# Patient Record
Sex: FEMALE | Race: WHITE
[De-identification: names, ages, dates, MRNs, and addresses within clinical notes are randomized per-mention and may not be internally consistent; named-entity substitution may affect disease eponyms.]

---

## 2023-08-18 ENCOUNTER — HOSPITAL ENCOUNTER (EMERGENCY)
Dept: HOSPITAL 33 - ED | Age: 33
LOS: 1 days | Discharge: HOME | End: 2023-08-19
Payer: COMMERCIAL

## 2023-08-18 VITALS — HEART RATE: 107 BPM | RESPIRATION RATE: 16 BRPM | TEMPERATURE: 97.6 F

## 2023-08-18 VITALS — SYSTOLIC BLOOD PRESSURE: 120 MMHG | OXYGEN SATURATION: 97 % | DIASTOLIC BLOOD PRESSURE: 82 MMHG

## 2023-08-18 DIAGNOSIS — N39.0: ICD-10-CM

## 2023-08-18 DIAGNOSIS — R56.9: ICD-10-CM
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
## 2023-08-18 DIAGNOSIS — Z79.899: ICD-10-CM

## 2023-08-18 DIAGNOSIS — S09.90XA: Primary | ICD-10-CM

## 2023-08-18 LAB
ALBUMIN SERPL-MCNC: 4.8 G/DL (ref 3.5–5)
ALP SERPL-CCNC: 71 U/L (ref 38–126)
ALT SERPL-CCNC: 21 U/L (ref 0–35)
ANION GAP SERPL CALC-SCNC: 15.2 MEQ/L (ref 5–15)
AST SERPL QL: 27 U/L (ref 14–36)
B-HCG SERPL QL: NEGATIVE
BACTERIA UR CULT: YES
BASOPHILS # BLD AUTO: 0.05 X10^3/UL (ref 0–0.4)
BASOPHILS NFR BLD AUTO: 0.4 % (ref 0–0.4)
BILIRUB BLD-MCNC: 0.6 MG/DL (ref 0.2–1.3)
BUN SERPL-MCNC: 12 MG/DL (ref 7–17)
CALCIUM SPEC-MCNC: 9.2 MG/DL (ref 8.4–10.2)
CHLORIDE SERPL-SCNC: 102 MMOL/L (ref 98–107)
CO2 SERPL-SCNC: 25 MMOL/L (ref 22–30)
CREAT SERPL-MCNC: 1 MG/DL (ref 0.52–1.04)
EOSINOPHIL # BLD AUTO: 0.05 X10^3/UL (ref 0–0.5)
ETHANOL SERPL-MCNC: < 10 MG/DL (ref 0–10)
GFR SERPLBLD BASED ON 1.73 SQ M-ARVRAT: > 60 ML/MIN
GLUCOSE SERPL-MCNC: 143 MG/DL (ref 74–106)
HCT VFR BLD AUTO: 42.9 % (ref 35–47)
HGB BLD-MCNC: 14.2 G/DL (ref 12–16)
IMM GRANULOCYTES # BLD: 0.05 X10^3U/L (ref 0–0.03)
IMM GRANULOCYTES NFR BLD: 0.4 % (ref 0–0.4)
LYMPHOCYTES # SPEC AUTO: 3.51 X10^3/UL (ref 1–4.6)
MCH RBC QN AUTO: 29.8 PG (ref 26–32)
MCHC RBC AUTO-ENTMCNC: 33.1 G/DL (ref 32–36)
MONOCYTES # BLD AUTO: 0.75 X10^3/UL (ref 0–1.3)
NRBC # BLD AUTO: 0 X10^3U/L (ref 0–0.01)
NRBC BLD AUTO-RTO: 0 % (ref 0–0.1)
PLATELET # BLD AUTO: 355 X10^3/UL (ref 150–450)
POTASSIUM SERPLBLD-SCNC: 3.9 MMOL/L (ref 3.5–5.1)
PROT SERPL-MCNC: 8.3 G/DL (ref 6.3–8.2)
PROT UR STRIP-MCNC: 30 MG/DL
RBC # BLD AUTO: 4.76 X10^6/UL (ref 4.1–5.4)
RBC # URNS HPF: (no result) /HPF (ref 0–5)
SODIUM SERPL-SCNC: 138 MMOL/L (ref 137–145)
WBC # BLD AUTO: 11.6 X10^3/UL (ref 4–10.5)
WBC URNS QL MICRO: (no result) /HPF (ref 0–5)

## 2023-08-18 PROCEDURE — 99284 EMERGENCY DEPT VISIT MOD MDM: CPT

## 2023-08-18 PROCEDURE — 70450 CT HEAD/BRAIN W/O DYE: CPT

## 2023-08-18 PROCEDURE — 36415 COLL VENOUS BLD VENIPUNCTURE: CPT

## 2023-08-18 PROCEDURE — 82077 ASSAY SPEC XCP UR&BREATH IA: CPT

## 2023-08-18 PROCEDURE — 80307 DRUG TEST PRSMV CHEM ANLYZR: CPT

## 2023-08-18 PROCEDURE — 36000 PLACE NEEDLE IN VEIN: CPT

## 2023-08-18 PROCEDURE — 94760 N-INVAS EAR/PLS OXIMETRY 1: CPT

## 2023-08-18 PROCEDURE — 85025 COMPLETE CBC W/AUTO DIFF WBC: CPT

## 2023-08-18 PROCEDURE — 72125 CT NECK SPINE W/O DYE: CPT

## 2023-08-18 PROCEDURE — 96374 THER/PROPH/DIAG INJ IV PUSH: CPT

## 2023-08-18 PROCEDURE — 93041 RHYTHM ECG TRACING: CPT

## 2023-08-18 PROCEDURE — 81001 URINALYSIS AUTO W/SCOPE: CPT

## 2023-08-18 PROCEDURE — P9612 CATHETERIZE FOR URINE SPEC: HCPCS

## 2023-08-18 PROCEDURE — 80053 COMPREHEN METABOLIC PANEL: CPT

## 2023-08-18 PROCEDURE — 84703 CHORIONIC GONADOTROPIN ASSAY: CPT

## 2023-08-18 PROCEDURE — 87086 URINE CULTURE/COLONY COUNT: CPT

## 2023-08-18 PROCEDURE — 96375 TX/PRO/DX INJ NEW DRUG ADDON: CPT

## 2023-08-18 NOTE — ERPHSYRPT
- History of Present Illness


Time Seen by Provider: 08/18/23 20:44


Source: patient, EMS


Exam Limitations: no limitations


Physician History: 





This is a 33-year-old white female who is brought into the emergency department 

by paramedics who provided independent history on this patient for sudden onset 

of seizures and vomiting followed being head injury.  Patient has a very large 

dog and the leash was wrapped around her waist and the dog took off running.  

Patient went somewhat airborne and then hit her head.  She was witnessed to have

a seizure followed by vomiting.  Patient has no history of seizures.  She is on 

methylphenidate and an antidepressant medication.  Patient has no other area of 

complaints of pain or injury.  She denies chest pain.  She denies shortness of 

breath.  She denies abdominal pain.  She has no pain in any extremity.  She has 

no complaints of back pain.


Occurred: just prior to arrival


Severity: mild (To moderate)


Head Injury Location: global


Method of Injury: fell


Loss of Consciousness: prolonged (minutes)


Associated Symptoms: nausea, vomiting, headaches, seizure, No shortness of breat

h, No chest pain


Allergies/Adverse Reactions: 








No Known Drug Allergies Allergy (Verified 08/18/23 20:58)


   





Home Medications: 








Fluoxetine HCl 40 mg PO DAILY 08/18/23 [History]


Methylphenidate HCl [Methylphenidate HCl Cd] 30 mg PO DAILY 08/18/23 [History]








Travel Risk





- International Travel


Have you traveled outside of the country in past 3 weeks: No





- Coronavirus Screening


Are you exhibiting any of the following symptoms?: No


Close contact with a COVID-19 positive Pt in past 14-21 Days: No





- Review of Systems


Constitutional: No Symptoms


Eyes: No Symptoms


Ears, Nose, & Throat: No Symptoms


Respiratory: No Symptoms


Cardiac: No Symptoms


Abdominal/Gastrointestinal: No Symptoms


Genitourinary Symptoms: No Symptoms


Musculoskeletal: No Symptoms


Skin: No Symptoms


Neurological: Headache, Seizure


Psychological: No Symptoms


Endocrine: No Symptoms


Hematologic/Lymphatic: No Symptoms


Immunological/Allergic: No Symptoms


All Other Systems: Reviewed and Negative





- Past Medical History


Pertinent Past Medical History: Yes





- Past Surgical History


Past Surgical History: Yes





- Nursing Vital Signs


Nursing Vital Signs: 


                               Initial Vital Signs











Temperature  97.6 F   08/18/23 20:33


 


Pulse Rate  107 H  08/18/23 20:33


 


Respiratory Rate  16   08/18/23 20:33


 


Blood Pressure  125/87   08/18/23 20:33


 


O2 Sat by Pulse Oximetry  98   08/18/23 20:33








                                   Pain Scale











Pain Intensity                 4

















- [facility] Coma Score


Best Eye Response ([place]): (4) open spontaneously


Best Verbal Response ([place]): (5) oriented


Best Motor Response ([place]): (6) obeys commands


[place] Total: 15





- Physical Exam


General Appearance: no apparent distress, alert, anxiety


Head Injury: no evidence of injury


Eye Exam: bilateral eye: normal inspection, PERRL, EOMI


ENT Exam: airway nml, nml ext.inspection, No evidence of ENT injury


Neck Exam: supple, trachea midline, full range of motion, normal alignment, 

normal inspection


Cardiovascular/Respiratory Exam: chest non-tender, no respiratory distress


Gastrointestinal/Abdominal Exam: soft, non tender, no distention, no mass, no 

guarding, no ecchymosis, no organomegaly, no pulsatile mass, normal bowel sounds


Pelvic Exam: not done


Rectal Exam: not done


Back Exam: normal inspection, normal range of motion, No CVA tenderness


Extremity Exam: non-tender, normal range of motion, normal inspection, normal 

capillary refill, no calf tenderness, no pedal edema, pelvis stable


Mental Status Exam: alert, oriented x 3, cooperative


CNs Exam: normal hearing, normal speech, PERRL


Coordination/Gait Exam: normal finger to nose, normal gait


Motor/Sensory Exam: no motor deficit, no sensory deficit


Skin Exam: normal color, warm, dry


Lymphatic Exam: No adenopathy


**SpO2 Interpretation**: normal


O2 Delivery: Room Air





- Course


Nursing assessment & vital signs reviewed: Yes


Ordered Tests: 


                               Active Orders 24 hr











 Category Date Time Status


 


 Cardiac Monitor STAT Care  08/18/23 20:39 Active


 


 Cath for Specimen-Straight STAT Care  08/18/23 22:53 Active


 


 Clean Catch Urine Specimen STAT Care  08/18/23 20:39 Active


 


 IV Insertion STAT Care  08/18/23 20:39 Active


 


 Pulse Oximetry (ED) STAT Care  08/18/23 20:39 Active


 


 CERVICAL SPINE WO CONTRAST [CT] Stat Exams  08/18/23 20:43 Completed


 


 HEAD WITHOUT CONTRAST [CT] Stat Exams  08/18/23 20:43 Completed


 


 CBC W DIFF Stat Lab  08/18/23 20:40 Completed


 


 CMP Stat Lab  08/18/23 20:40 Completed


 


 CULTURE,URINE Stat Lab  08/18/23 23:23 Received


 


 ETHYL ALCOHOL Stat Lab  08/18/23 20:40 Completed


 


 HCG QUALITATIVE, SERUM Stat Lab  08/18/23 20:40 Completed


 


 UA W/RFX UR CULTURE Stat Lab  08/18/23 23:23 Completed


 


 Urine Triage Profile Stat Lab  08/18/23 23:23 Received








Medication Summary











Generic Name Dose Route Start Last Admin





  Trade Name Allie  PRN Reason Stop Dose Admin


 


Sodium Chloride  1,000 mls @ 100 mls/hr  08/18/23 20:45  08/18/23 20:52





  Sodium Chloride 0.9% 1000 Ml  IV  09/17/23 20:44  100 mls/hr





  .Q10H JANE   Administration














Discontinued Medications














Generic Name Dose Route Start Last Admin





  Trade Name Allie  PRN Reason Stop Dose Admin


 


Lorazepam  1 mg  08/18/23 20:39  08/18/23 20:54





  Lorazepam 2 Mg/1 Ml*** 2 Mg Vial  IV  08/18/23 20:40  1 mg





  STAT ONE   Administration


 


Lorazepam  Confirm  08/18/23 20:47 





  Lorazepam 2 Mg/1 Ml*** 2 Mg Vial  Administered  08/18/23 20:48 





  Dose  





  2 mg  





  .ROUTE  





  .STK-MED ONE  


 


Ondansetron HCl  4 mg  08/18/23 20:39  08/18/23 20:53





  Ondansetron Hcl 4 Mg/2 Ml Vial  IV  08/18/23 20:40  4 mg





  STAT ONE   Administration


 


Ondansetron HCl  Confirm  08/18/23 20:47 





  Ondansetron Hcl 4 Mg/2 Ml Vial  Administered  08/18/23 20:48 





  Dose  





  4 mg  





  .ROUTE  





  .STK-MED ONE  











Lab/Rad Data: 


                           Laboratory Result Diagrams





                                 08/18/23 20:40 





                                 08/18/23 20:40 





                               Laboratory Results











  08/18/23 08/18/23 08/18/23 Range/Units





  23:23 20:40 20:40 


 


WBC     (4.0-10.5)  x10^3/uL


 


RBC     (4.1-5.4)  x10^6/uL


 


Hgb     (12.0-16.0)  g/dL


 


Hct     (35-47)  %


 


MCV     ()  fL


 


MCH     (26-32)  pg


 


MCHC     (32-36)  g/dL


 


RDW     (11.5-14.0)  %


 


Plt Count     (150-450)  x10^3/uL


 


MPV     (7.5-11.0)  fL


 


Gran %     (36.0-66.0)  %


 


Immature Gran % (Auto)     (0.00-0.4)  %


 


Nucleat RBC Rel Count     (0.00-0.1)  %


 


Eos # (Auto)     (0-0.5)  x10^3/uL


 


Immature Gran # (Auto)     (0.00-0.03)  x10^3u/L


 


Absolute Lymphs (auto)     (1.0-4.6)  x10^3/uL


 


Absolute Monos (auto)     (0.0-1.3)  x10^3/uL


 


Absolute Nucleated RBC     (0.00-0.01)  x10^3u/L


 


Lymphocytes %     (24.0-44.0)  %


 


Monocytes %     (0.0-12.0)  %


 


Eosinophils %     (0.00-5.0)  %


 


Basophils %     (0.0-0.4)  %


 


Absolute Granulocytes     (1.4-6.9)  x10^3/uL


 


Basophils #     (0-0.4)  x10^3/uL


 


Sodium    138  (137-145)  mmol/L


 


Potassium    3.9  (3.5-5.1)  mmol/L


 


Chloride    102  ()  mmol/L


 


Carbon Dioxide    25  (22-30)  mmol/L


 


Anion Gap    15.2 H  (5-15)  MEQ/L


 


BUN    12  (7-17)  mg/dL


 


Creatinine    1.00  (0.52-1.04)  mg/dL


 


Estimated GFR    > 60.0  ML/MIN


 


Glucose    143 H  ()  mg/dL


 


Calcium    9.2  (8.4-10.2)  mg/dL


 


Total Bilirubin    0.60  (0.2-1.3)  mg/dL


 


AST    27  (14-36)  U/L


 


ALT    21  (0-35)  U/L


 


Alkaline Phosphatase    71  ()  U/L


 


Serum Total Protein    8.3 H  (6.3-8.2)  g/dL


 


Albumin    4.8  (3.5-5.0)  g/dL


 


Serum HCG, Qual   NEGATIVE   (NEGATIVE)  


 


Urine Color  Dark Yellow A    (Yellow)  


 


Urine Appearance  Clear    (Clear)  


 


Urine pH  6.5    (4.6-8.0)  


 


Ur Specific Gravity  >=1.030 A    (1.005-1.030)  


 


Urine Protein  30    (Negative)  


 


Urine Glucose (UA)  Negative    (Negative)  mg/dL


 


Urine Ketones  Trace A    (Negative)  


 


Urine Blood  Negative    (Negative)  


 


Urine Nitrite  Negative    (Negative)  


 


Urine Bilirubin  Negative    (Negative)  


 


Urine Urobilinogen  1.0 A    (0.2)  mg/dL


 


Ur Leukocyte Esterase  Trace A    (Negative)  


 


U Hyaline Cast (Auto)  NONE SEEN    (0-2)  /LPF


 


Urine Microscopic RBC  3-5    (0-5)  /HPF


 


Urine Microscopic WBC  3-5    (0-5)  /HPF


 


Ur Epithelial Cells  None Seen    (None Seen)  /HPF


 


Urine Bacteria  None Seen    (None Seen)  /HPF


 


Urine Culture Reflexed  YES    (NO)  


 


Ethyl Alcohol    < 10  (0-10)  mg/dL














  08/18/23 Range/Units





  20:40 


 


WBC  11.6 H  (4.0-10.5)  x10^3/uL


 


RBC  4.76  (4.1-5.4)  x10^6/uL


 


Hgb  14.2  (12.0-16.0)  g/dL


 


Hct  42.9  (35-47)  %


 


MCV  90.1  ()  fL


 


MCH  29.8  (26-32)  pg


 


MCHC  33.1  (32-36)  g/dL


 


RDW  12.1  (11.5-14.0)  %


 


Plt Count  355  (150-450)  x10^3/uL


 


MPV  9.6  (7.5-11.0)  fL


 


Gran %  62.0  (36.0-66.0)  %


 


Immature Gran % (Auto)  0.4  (0.00-0.4)  %


 


Nucleat RBC Rel Count  0.0  (0.00-0.1)  %


 


Eos # (Auto)  0.05  (0-0.5)  x10^3/uL


 


Immature Gran # (Auto)  0.05 H  (0.00-0.03)  x10^3u/L


 


Absolute Lymphs (auto)  3.51  (1.0-4.6)  x10^3/uL


 


Absolute Monos (auto)  0.75  (0.0-1.3)  x10^3/uL


 


Absolute Nucleated RBC  0.00  (0.00-0.01)  x10^3u/L


 


Lymphocytes %  30.3  (24.0-44.0)  %


 


Monocytes %  6.5  (0.0-12.0)  %


 


Eosinophils %  0.4  (0.00-5.0)  %


 


Basophils %  0.4  (0.0-0.4)  %


 


Absolute Granulocytes  7.18 H  (1.4-6.9)  x10^3/uL


 


Basophils #  0.05  (0-0.4)  x10^3/uL


 


Sodium   (137-145)  mmol/L


 


Potassium   (3.5-5.1)  mmol/L


 


Chloride   ()  mmol/L


 


Carbon Dioxide   (22-30)  mmol/L


 


Anion Gap   (5-15)  MEQ/L


 


BUN   (7-17)  mg/dL


 


Creatinine   (0.52-1.04)  mg/dL


 


Estimated GFR   ML/MIN


 


Glucose   ()  mg/dL


 


Calcium   (8.4-10.2)  mg/dL


 


Total Bilirubin   (0.2-1.3)  mg/dL


 


AST   (14-36)  U/L


 


ALT   (0-35)  U/L


 


Alkaline Phosphatase   ()  U/L


 


Serum Total Protein   (6.3-8.2)  g/dL


 


Albumin   (3.5-5.0)  g/dL


 


Serum HCG, Qual   (NEGATIVE)  


 


Urine Color   (Yellow)  


 


Urine Appearance   (Clear)  


 


Urine pH   (4.6-8.0)  


 


Ur Specific Gravity   (1.005-1.030)  


 


Urine Protein   (Negative)  


 


Urine Glucose (UA)   (Negative)  mg/dL


 


Urine Ketones   (Negative)  


 


Urine Blood   (Negative)  


 


Urine Nitrite   (Negative)  


 


Urine Bilirubin   (Negative)  


 


Urine Urobilinogen   (0.2)  mg/dL


 


Ur Leukocyte Esterase   (Negative)  


 


U Hyaline Cast (Auto)   (0-2)  /LPF


 


Urine Microscopic RBC   (0-5)  /HPF


 


Urine Microscopic WBC   (0-5)  /HPF


 


Ur Epithelial Cells   (None Seen)  /HPF


 


Urine Bacteria   (None Seen)  /HPF


 


Urine Culture Reflexed   (NO)  


 


Ethyl Alcohol   (0-10)  mg/dL














- Progress


Progress: improved, re-examined


Progress Note: 





08/18/23 22:55


CT scan of the head shows no acute intracranial abnormality.


CT scan of cervical spine without contrast shows no acute fracture or 

subluxation.  There is evidence of muscle spasm.





This patient's medical issue is 1 of moderate complexity.  Level complexity in 

the work-up performed based on review the patient's past medical history, review

 of the patient's medication list, review the patient drug allergy list, history

 present illness and physical findings on examination.  The work-up of this 

patient includes urinalysis, CT scan of the head and CT scan of the cervical 

spine both without contrast.  In addition, we placed an intravenous line and 

provide the patient with intravenous Zofran and intravenous Ativan, CBC, CMP and

 pregnancy test.


Counseled pt/family regarding: lab results, diagnosis, need for follow-up, rad 

results





Medical Desision Making





- Independent Historian


Additional History obtained from: Spouse, Paramedic/EMT





- Diagnostic Testing


Diagnostic test were ordered, analyzed, and reviewed by me: Yes


Radiological Interpretation: Reviewed by me, Teleradiologist Report





- Risk of complications


The pt has a mod risk of morbidity or mortality based on: Need for prescription 

drug management





- Departure


Departure Disposition: Home


Clinical Impression: 


 Head injury, Seizure, UTI (urinary tract infection)





Condition: Stable


Critical Care Time: No


Referrals: 


DOCTOR,NO FAMILY [Primary Care Provider] - Follow up/PCP as directed


Additional Instructions: 


Drink plenty of fluids.  Take your medication as prescribed.  Wake this patient 

up every 2 hours for the next 10 hours.  Return to the emergency department if 

intractable headache, intractable vomiting, or patient not acting her usual 

self.  Use Tylenol and ibuprofen for headache and pain control.  Call your prima

 care provider on Monday, 8/21/2023 for further evaluation management.


Prescriptions: 


Ondansetron ODT 4 MG*** [Zofran Odt 4 mg***] 4 mg PO Q6H PRN PRN #10 tablet


 PRN Reason: Vomiting


Cephalexin Mh 500 mg** [Keflex 500 mg**] 500 mg PO TID #21 cap

## 2023-08-18 NOTE — XRAY
CLINICAL HISTORY:Fall with head injury

COMPARISON:None.

TECHNIQUE:Non-enhanced CT scan of the cervical spine in the axial plane with

multiplanar reconstructions.

FINDINGS:

Reduced cervical lordotic curve suggestive of muscle spasm.

Normal CT appearance of the craniocervical junction.

No signs of spinal instability.

Unremarkable facet joints and spinous processes.

No vertebral wedging or collapse.



Preserved spinal canal with no retropulsed fragments.

The cervical disks are of normal height.

No significant disc protrusion.

No suspicious focal bony lesions.

No paraspinal masses or collections.

IMPRESSION:

1. Unremarkable CT Cervical spine [apart from neck muscle spasm].

2. No related significant spinal injury.



_____________________________________





Electronically Signed by: Madiha Torres MD. (08/18/2023 21:21:22 CST)

## 2023-08-18 NOTE — XRAY
CLINICAL HISTORY:Fall with head injury

COMPARISON:None.

TECHNIQUE:Non-enhanced CT scan of the brain was performed in the axial plane

in bony and soft tissue windows with multiplanar reconstructions.

FINDINGS:

Normal CT attenuation of both cerebral hemispheres with no areas of abnormal

attenuation values.

No suspicious space-occupying lesions.

No intra or extra-axial collections of fresh blood density.

Normal size and shape of the ventricles, basal cisterns, and cortical sulci.



The basal ganglia, thalamus, and internal capsule appear normal.

Brainstem and isaías appear normal.

No shift of midline structures.



Unremarkable posterior fossa.

Largely preserved cranial calvarial bones.

Visualized paranasal sinuses appear clear.

IMPRESSION:

No acute intracranial abnormality.

_____________________________________





Electronically Signed by: Madiha Torres MD. (08/18/2023 21:10:25 CST)

## 2023-08-19 LAB
AMPHETAMINES UR QL: NEGATIVE
BARBITURATES UR QL: NEGATIVE
BENZODIAZ UR QL SCN: NEGATIVE
COCAINE UR QL SCN: NEGATIVE
METHADONE UR QL: NEGATIVE
OPIATES UR QL: NEGATIVE
PCP UR QL CFM>20 NG/ML: NEGATIVE
THC UR QL SCN: NEGATIVE